# Patient Record
Sex: FEMALE | Race: WHITE | HISPANIC OR LATINO | Employment: UNEMPLOYED | URBAN - METROPOLITAN AREA
[De-identification: names, ages, dates, MRNs, and addresses within clinical notes are randomized per-mention and may not be internally consistent; named-entity substitution may affect disease eponyms.]

---

## 2017-01-27 ENCOUNTER — LAB VISIT (OUTPATIENT)
Dept: LAB | Facility: HOSPITAL | Age: 42
End: 2017-01-27
Attending: FAMILY MEDICINE
Payer: MEDICAID

## 2017-01-27 ENCOUNTER — CLINICAL SUPPORT (OUTPATIENT)
Dept: CARDIOLOGY | Facility: CLINIC | Age: 42
End: 2017-01-27
Payer: MEDICAID

## 2017-01-27 ENCOUNTER — OFFICE VISIT (OUTPATIENT)
Dept: INTERNAL MEDICINE | Facility: CLINIC | Age: 42
End: 2017-01-27
Payer: MEDICAID

## 2017-01-27 VITALS
TEMPERATURE: 98 F | HEART RATE: 94 BPM | HEIGHT: 65 IN | BODY MASS INDEX: 20.9 KG/M2 | WEIGHT: 125.44 LBS | OXYGEN SATURATION: 100 % | DIASTOLIC BLOOD PRESSURE: 78 MMHG | SYSTOLIC BLOOD PRESSURE: 120 MMHG

## 2017-01-27 DIAGNOSIS — R17 ELEVATED BILIRUBIN: ICD-10-CM

## 2017-01-27 DIAGNOSIS — Z12.39 BREAST SCREENING: ICD-10-CM

## 2017-01-27 DIAGNOSIS — E89.0 POSTOPERATIVE HYPOTHYROIDISM: ICD-10-CM

## 2017-01-27 DIAGNOSIS — R51.9 ACUTE NONINTRACTABLE HEADACHE, UNSPECIFIED HEADACHE TYPE: ICD-10-CM

## 2017-01-27 DIAGNOSIS — R53.83 FATIGUE, UNSPECIFIED TYPE: Primary | ICD-10-CM

## 2017-01-27 DIAGNOSIS — R00.2 HEART PALPITATIONS: ICD-10-CM

## 2017-01-27 DIAGNOSIS — R53.83 FATIGUE, UNSPECIFIED TYPE: ICD-10-CM

## 2017-01-27 LAB
25(OH)D3+25(OH)D2 SERPL-MCNC: 35 NG/ML
ALBUMIN SERPL BCP-MCNC: 4.4 G/DL
ALP SERPL-CCNC: 46 U/L
ALT SERPL W/O P-5'-P-CCNC: 11 U/L
ANION GAP SERPL CALC-SCNC: 6 MMOL/L
AST SERPL-CCNC: 17 U/L
BASOPHILS # BLD AUTO: 0.04 K/UL
BASOPHILS NFR BLD: 0.5 %
BILIRUB SERPL-MCNC: 1.1 MG/DL
BUN SERPL-MCNC: 10 MG/DL
CALCIUM SERPL-MCNC: 9.2 MG/DL
CHLORIDE SERPL-SCNC: 108 MMOL/L
CO2 SERPL-SCNC: 28 MMOL/L
CREAT SERPL-MCNC: 0.9 MG/DL
DIFFERENTIAL METHOD: NORMAL
EOSINOPHIL # BLD AUTO: 0.1 K/UL
EOSINOPHIL NFR BLD: 1 %
ERYTHROCYTE [DISTWIDTH] IN BLOOD BY AUTOMATED COUNT: 12.7 %
EST. GFR  (AFRICAN AMERICAN): >60 ML/MIN/1.73 M^2
EST. GFR  (NON AFRICAN AMERICAN): >60 ML/MIN/1.73 M^2
GLUCOSE SERPL-MCNC: 105 MG/DL
HCT VFR BLD AUTO: 41.2 %
HGB BLD-MCNC: 14.1 G/DL
LYMPHOCYTES # BLD AUTO: 2.1 K/UL
LYMPHOCYTES NFR BLD: 27.7 %
MCH RBC QN AUTO: 29.7 PG
MCHC RBC AUTO-ENTMCNC: 34.2 %
MCV RBC AUTO: 87 FL
MONOCYTES # BLD AUTO: 0.6 K/UL
MONOCYTES NFR BLD: 8.1 %
NEUTROPHILS # BLD AUTO: 4.8 K/UL
NEUTROPHILS NFR BLD: 62.6 %
PLATELET # BLD AUTO: 233 K/UL
PMV BLD AUTO: 11.5 FL
POTASSIUM SERPL-SCNC: 4.6 MMOL/L
PROT SERPL-MCNC: 8.1 G/DL
RBC # BLD AUTO: 4.75 M/UL
SODIUM SERPL-SCNC: 142 MMOL/L
T3 SERPL-MCNC: 91 NG/DL
T4 SERPL-MCNC: 7.4 UG/DL
TSH SERPL DL<=0.005 MIU/L-ACNC: 2.44 UIU/ML
VIT B12 SERPL-MCNC: 734 PG/ML
WBC # BLD AUTO: 7.68 K/UL

## 2017-01-27 PROCEDURE — 84443 ASSAY THYROID STIM HORMONE: CPT

## 2017-01-27 PROCEDURE — 93010 ELECTROCARDIOGRAM REPORT: CPT | Mod: S$PBB,,, | Performed by: INTERNAL MEDICINE

## 2017-01-27 PROCEDURE — 84480 ASSAY TRIIODOTHYRONINE (T3): CPT

## 2017-01-27 PROCEDURE — 99999 PR PBB SHADOW E&M-EST. PATIENT-LVL III: CPT | Mod: PBBFAC,,, | Performed by: FAMILY MEDICINE

## 2017-01-27 PROCEDURE — 82306 VITAMIN D 25 HYDROXY: CPT

## 2017-01-27 PROCEDURE — 85025 COMPLETE CBC W/AUTO DIFF WBC: CPT

## 2017-01-27 PROCEDURE — 99215 OFFICE O/P EST HI 40 MIN: CPT | Mod: S$PBB,,, | Performed by: FAMILY MEDICINE

## 2017-01-27 PROCEDURE — 36415 COLL VENOUS BLD VENIPUNCTURE: CPT | Mod: PO

## 2017-01-27 PROCEDURE — 84436 ASSAY OF TOTAL THYROXINE: CPT

## 2017-01-27 PROCEDURE — 80053 COMPREHEN METABOLIC PANEL: CPT

## 2017-01-27 PROCEDURE — 82607 VITAMIN B-12: CPT

## 2017-01-27 NOTE — PROGRESS NOTES
"Subjective:       Patient ID: Susana Link is a 41 y.o. female.    Chief Complaint: Follow-up    HPI Comments: 41-year-old female patient with Patient Active Problem List:     Postoperative hypothyroidism     Atopic rhinitis  Reports fatigue, has been taking her thyroid medication regularly.  Patient reported that she has noticed while she was taking over-the-counter multivitamins has been having mild nausea lasting for a minute but subsides immediately patient started to have left-sided parietal headaches for the past 1 month off and on lately, 7/10 but denies of any blurry vision, has seen ophthalmologist 6 months ago, wears glasses no changes in prescription noted.   Patient has also been having occasional palpitations.   Denies of any anxiety or depression.   Denies of any significant constipation.   Requesting refill on thyroid medication today  Menstrual cycles regular, last menstrual cycle 3 weeks ago.      Review of Systems   Constitutional: Positive for fatigue.   Eyes: Negative for visual disturbance.   Respiratory: Negative for shortness of breath.    Cardiovascular: Positive for palpitations. Negative for chest pain and leg swelling.   Gastrointestinal: Negative for abdominal pain, constipation, nausea and vomiting.   Endocrine: Negative for cold intolerance and heat intolerance.   Musculoskeletal: Negative for myalgias.   Skin: Negative for rash.   Neurological: Positive for headaches. Negative for weakness, light-headedness and numbness.   Psychiatric/Behavioral: Negative for sleep disturbance and suicidal ideas. The patient is not nervous/anxious.          Visit Vitals    /78    Pulse 94    Temp 97.7 °F (36.5 °C) (Tympanic)    Ht 5' 5" (1.651 m)    Wt 56.9 kg (125 lb 7.1 oz)    LMP 01/13/2017    SpO2 100%    BMI 20.87 kg/m2     Objective:      Physical Exam   Constitutional: She is oriented to person, place, and time. She appears well-developed and well-nourished.   HENT: "   Head: Normocephalic and atraumatic.   Mouth/Throat: Oropharynx is clear and moist.   Cardiovascular: Normal rate, regular rhythm and normal heart sounds.    No murmur heard.  Pulmonary/Chest: Effort normal and breath sounds normal. She has no wheezes.   Abdominal: Soft. Bowel sounds are normal. There is no tenderness.   Neurological: She is alert and oriented to person, place, and time.   Skin: Skin is warm and dry.   Psychiatric: She has a normal mood and affect.         Assessment:       1. Fatigue, unspecified type    2. Postoperative hypothyroidism    3. Elevated bilirubin    4. Breast screening    5. Parietal headache    6. Heart palpitations        Plan:   Fatigue, unspecified type  -     Comprehensive metabolic panel; Future; Expected date: 1/27/17  -     TSH; Future; Expected date: 1/27/17  -     Vitamin D; Future; Expected date: 1/27/17  -     Vitamin B12; Future; Expected date: 1/27/17  -     CBC auto differential; Future; Expected date: 1/27/17    Will check complete labs today for chronic fatigue, likely secondary to thyroid levels.  Encouraged to eat low-fat and low-cholesterol diet and protein rich diet    Postoperative hypothyroidism  -     TSH; Future; Expected date: 1/27/17  -     T3; Future; Expected date: 1/27/17  -     T4; Future; Expected date: 1/27/17  Currently taking on levothyroxine 88 µg by mouth daily, will recheck levels today and if appropriate will consider refill    Elevated bilirubin  -     Comprehensive metabolic panel; Future; Expected date: 1/27/17  Was elevated in the past, patient clinically asymptomatic    Breast screening  -     Mammo Digital Screening Bilat with CAD; Future; Expected date: 1/27/17  Due for mammogram    Parietal headache  -     CT Head With Contrast; Future; Expected date: 1/27/17  Secondary to headaches lately off and on more frequently, has not been drinking any caffeine beverages, will get CT scan to look into further etiology    Heart palpitations  -      EKG 12-lead; Future  Will get EKG to look at palpitations

## 2017-01-27 NOTE — MR AVS SNAPSHOT
[facility]  [address] [place] [address]  Phone: [phone]  Fax: [phone]                  Susana Link   2017 3:00 PM   Office Visit    Description:  Female : 1975   Provider:  Antonietta Goel MD   Department:  [facility]           Reason for Visit     Follow-up           Diagnoses this Visit        Comments    Fatigue, unspecified type    -  Primary     Postoperative hypothyroidism         Elevated bilirubin         Breast screening         Parietal headache         Heart palpitations                To Do List           Future Appointments        Provider Department Dept Phone    2017 [phone]    2017 5:00 PM LAB, [facility] [phone]    2017 1:45 PM [facility] MAMMO1-SCR [facility] [phone]    2017 2:10 PM [facility] CT1 LIMIT 500 LBS [facility] [phone]      Goals (5 Years of Data)     None      Follow-Up and Disposition     Return in about 6 months (around 2017), or if symptoms worsen or fail to improve.      [facility]     [facility] -  Assistance  Registered nurses in the [facility] provide clinical advisement, health education, appointment booking, and other advisory services.  Call for this free service at [phone].             Medications           Message regarding Medications     Verify the changes and/or additions to your medication regime listed below are the same as discussed with your clinician today.  If any of these changes or additions are incorrect, please notify your healthcare provider.             Verify that the below list of medications is an accurate representation of the medications you are currently taking.  If none reported, the list may be blank. If incorrect, please contact your healthcare provider. Carry this list with you in  "case of emergency.           Current Medications     levothyroxine (SYNTHROID) 88 MCG tablet Take 1 tablet (88 mcg total) by mouth once daily.           Clinical Reference Information           Vital Signs - Last Recorded  Most recent update: 1/27/2017  3:25 PM by Jaqueline Brizuela LPN    BP Pulse Temp Ht Wt LMP    120/78 94 97.7 °F (36.5 °C) (Tympanic) 5' 5" (1.651 m) 56.9 kg (125 lb 7.1 oz) 01/13/2017    SpO2 BMI             100% 20.87 kg/m2         Blood Pressure          Most Recent Value    BP  120/78      Allergies as of 1/27/2017     Ibuprofen      Immunizations Administered on Date of Encounter - 1/27/2017     None      Orders Placed During Today's Visit     Future Labs/Procedures Expected by Expires    CBC auto differential  1/27/2017 3/28/2018    Comprehensive metabolic panel  1/27/2017 3/28/2018    CT Head With Contrast  1/27/2017 1/27/2018    Mammo Digital Screening Bilat with CAD  1/27/2017 3/29/2018    T3  1/27/2017 3/28/2018    T4  1/27/2017 3/28/2018    TSH  1/27/2017 3/28/2018    Vitamin B12  1/27/2017 3/28/2018    Vitamin D  1/27/2017 3/28/2018    EKG 12-lead  As directed 1/27/2018      "

## 2017-01-30 ENCOUNTER — TELEPHONE (OUTPATIENT)
Dept: INTERNAL MEDICINE | Facility: CLINIC | Age: 42
End: 2017-01-30

## 2017-01-30 DIAGNOSIS — E03.4 HYPOTHYROIDISM DUE TO ACQUIRED ATROPHY OF THYROID: ICD-10-CM

## 2017-01-30 RX ORDER — LEVOTHYROXINE SODIUM 88 UG/1
88 TABLET ORAL DAILY
Qty: 90 TABLET | Refills: 1 | Status: SHIPPED | OUTPATIENT
Start: 2017-01-30 | End: 2017-07-18 | Stop reason: SDUPTHER

## 2017-02-03 ENCOUNTER — TELEPHONE (OUTPATIENT)
Dept: INTERNAL MEDICINE | Facility: CLINIC | Age: 42
End: 2017-02-03

## 2017-02-03 DIAGNOSIS — R51.9 ACUTE NONINTRACTABLE HEADACHE, UNSPECIFIED HEADACHE TYPE: Primary | ICD-10-CM

## 2017-02-03 NOTE — TELEPHONE ENCOUNTER
----- Message from Jaqueline Brizuela LPN sent at 2/3/2017 10:48 AM CST -----  Regarding: FW: CT ORDER      ----- Message -----     From: RT Christina     Sent: 2/3/2017  10:45 AM       To: [facility] Staff  Subject: CT ORDER                                         Ms Link has a CT HEAD WITH CONTRAST ordered for Monday at 2:10.  Per radiologist protocol it should be ordered as a CT HEAD WITHOUT AND WITH CONTRAST.  A CT HEAD WITH CONTRAST should only be ordered if they patient recently had a CT HEAD WITHOUT CONTRAST.     Thanks Melissa Chinchilla   Please call with any questions [phone]

## 2017-02-03 NOTE — TELEPHONE ENCOUNTER
Peer to peer Discussion has been done, discussed with on-call physician, and was informed that patient would benefit with MRI of the brain, so orders will be changed MRI brain with and without contrast, and radiology department will receive the fax from peer to peer Department, for approval for this testing.

## 2017-02-03 NOTE — TELEPHONE ENCOUNTER
----- Message from Antonietta Goel MD sent at 2/3/2017  1:52 PM CST -----  Contact:   Let  the patient know that she will get MRI Brain scheduled next week    Dr. Goel  ----- Message -----     From: Jaqueline Brizuela LPN     Sent: 2/3/2017   1:32 PM       To: Antonietta Goel MD        ----- Message -----     From: Kaylan Pastor     Sent: 2/3/2017   1:11 PM       To: [facility] Staff    further documentation needed for ct scan, has been denied. pls call when done..[phone] (home)

## 2017-02-06 ENCOUNTER — HOSPITAL ENCOUNTER (OUTPATIENT)
Dept: RADIOLOGY | Facility: HOSPITAL | Age: 42
Discharge: HOME | End: 2017-02-06
Attending: FAMILY MEDICINE
Payer: MEDICAID

## 2017-02-06 DIAGNOSIS — Z12.39 BREAST SCREENING: ICD-10-CM

## 2017-02-06 PROCEDURE — 77067 SCR MAMMO BI INCL CAD: CPT | Mod: TC

## 2017-02-06 PROCEDURE — 77067 SCR MAMMO BI INCL CAD: CPT | Mod: 26,,, | Performed by: RADIOLOGY

## 2017-02-06 PROCEDURE — 77063 BREAST TOMOSYNTHESIS BI: CPT | Mod: 26,,, | Performed by: RADIOLOGY

## 2017-02-11 ENCOUNTER — HOSPITAL ENCOUNTER (OUTPATIENT)
Dept: RADIOLOGY | Facility: HOSPITAL | Age: 42
Discharge: HOME | End: 2017-02-11
Attending: FAMILY MEDICINE
Payer: MEDICAID

## 2017-02-11 DIAGNOSIS — R51.9 ACUTE NONINTRACTABLE HEADACHE, UNSPECIFIED HEADACHE TYPE: ICD-10-CM

## 2017-02-11 PROCEDURE — A9585 GADOBUTROL INJECTION: HCPCS | Mod: PO | Performed by: FAMILY MEDICINE

## 2017-02-11 PROCEDURE — 25500020 PHARM REV CODE 255: Mod: PO | Performed by: FAMILY MEDICINE

## 2017-02-11 PROCEDURE — 70553 MRI BRAIN STEM W/O & W/DYE: CPT | Mod: 26,,, | Performed by: RADIOLOGY

## 2017-02-11 PROCEDURE — 70553 MRI BRAIN STEM W/O & W/DYE: CPT | Mod: TC,PO

## 2017-02-11 RX ORDER — GADOBUTROL 604.72 MG/ML
5 INJECTION INTRAVENOUS
Status: COMPLETED | OUTPATIENT
Start: 2017-02-11 | End: 2017-02-11

## 2017-02-11 RX ADMIN — GADOBUTROL 5 ML: 604.72 INJECTION INTRAVENOUS at 09:02

## 2017-07-18 DIAGNOSIS — E03.4 HYPOTHYROIDISM DUE TO ACQUIRED ATROPHY OF THYROID: ICD-10-CM

## 2017-07-18 RX ORDER — LEVOTHYROXINE SODIUM 88 UG/1
88 TABLET ORAL DAILY
Qty: 90 TABLET | Refills: 1 | Status: SHIPPED | OUTPATIENT
Start: 2017-07-18 | End: 2017-07-20 | Stop reason: SDUPTHER

## 2017-07-20 ENCOUNTER — PATIENT MESSAGE (OUTPATIENT)
Dept: INTERNAL MEDICINE | Facility: CLINIC | Age: 42
End: 2017-07-20

## 2017-07-20 DIAGNOSIS — E03.4 HYPOTHYROIDISM DUE TO ACQUIRED ATROPHY OF THYROID: ICD-10-CM

## 2017-07-20 RX ORDER — LEVOTHYROXINE SODIUM 88 UG/1
88 TABLET ORAL DAILY
Qty: 90 TABLET | Refills: 1 | Status: SHIPPED | OUTPATIENT
Start: 2017-07-20

## 2017-07-20 NOTE — TELEPHONE ENCOUNTER
Pt states that insurance denied her medication because the prescription was older than 6 months. Order placed for refill of Levothyroxine.

## 2017-11-10 ENCOUNTER — TELEPHONE (OUTPATIENT)
Dept: INTERNAL MEDICINE | Facility: CLINIC | Age: 42
End: 2017-11-10

## 2017-11-10 NOTE — TELEPHONE ENCOUNTER
----- Message from Chantell Aguayo sent at 11/10/2017  9:39 AM CST -----  Contact: Pt- Alex   Pt- Alex called to schedule pt annual check up online showed appointment available in November but when pt called to schedule system gave April 3 of next year pt requested to be worked in sooner callback number is..[phone] (home)

## 2017-11-10 NOTE — TELEPHONE ENCOUNTER
Spoke to pt. . Pt is scheduled with soonest available female provider per pt request. Pt  accepted appt., pt  verbalized understanding